# Patient Record
Sex: FEMALE | Race: WHITE | Employment: STUDENT | URBAN - METROPOLITAN AREA
[De-identification: names, ages, dates, MRNs, and addresses within clinical notes are randomized per-mention and may not be internally consistent; named-entity substitution may affect disease eponyms.]

---
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
## 2018-01-31 ENCOUNTER — TELEPHONE (OUTPATIENT)
Dept: PEDIATRICS CLINIC | Age: 18
End: 2018-01-31

## 2018-01-31 NOTE — TELEPHONE ENCOUNTER
Mother states that pt called her from school([employer]) and is c/o of her right side of her chest hurting. No SOB no indigestion feelings, no arm tingling noted. Mother did confirm pt has no cold symptoms that she is aware of but stated that pt just recently had a hard breakup with her boyfriend. Mother feels it may be anxiety related. Advised pt to come home and relax, quiet room slow breathing and nothing too stimulating. Scheduled pt appt this afternoon with TP. Advised mother to come in at 4pm and [facility]. Mother confirmed.

## 2018-02-08 NOTE — TELEPHONE ENCOUNTER
Called to talk to patient and reach mother (number in chart is momDionne' number). Confirmed name,  and discussed how patient is doing. Mom notes she took patient to the ER on 18 with chest pain and all tests were neg. Feels issues were anxiety and stress driven after a break-up with her fiance. Mom states patient is doing better but still hurting. She is back in college but not turning some of her assignments in. Mom hopes she will get better with time and appreciates phone call. Mom did give patient's cell number ([phone]) and stated she would be fine with a phone call. Will follow up today with patient.

## 2018-02-19 NOTE — TELEPHONE ENCOUNTER
Patient stopped by last week to drop off medical release of information. Patient had appointment with GYN for continual unilateral galactorrhea. Spoke to patient and she is doing better. Medical release form completed and sent to Dr. Aneesh De La Garza office.
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
## 2021-09-14 NOTE — PROGRESS NOTES
HISTORY OF PRESENT ILLNESS  Siri Meza is a 21 y.o. female. HPI  Sore Throat  Patient complains of sore throat. Associated symptoms include sinus and nasal congestion, sore throat, right ear pain, fevers up to 100.9 degrees, productive cough, green nasal discharge and pain while swallowing. Left ear started to feel funny today. Onset of symptoms was several days ago, gradually worsening since that time. She is drinking plenty of fluids. . She has not had recent close exposure to someone with proven streptococcal pharyngitis. Was engaged this past July! Review of Systems   Constitutional: Positive for chills (4 days ago) and fever. HENT: Positive for congestion, ear pain, sore throat and tinnitus. Eyes: Negative for discharge. Respiratory: Positive for cough and sputum production. Gastrointestinal: Negative. Skin: Negative. Physical Exam   Constitutional: She appears well-developed and well-nourished. No distress. HENT:   Right Ear: External ear and ear canal normal. Tympanic membrane is erythematous and bulging. Tympanic membrane mobility is abnormal.   Left Ear: Tympanic membrane, external ear and ear canal normal.   Nose: Mucosal edema (w thick secretions) present. Mouth/Throat: Uvula is midline. No oropharyngeal exudate. Cardiovascular: Normal rate and regular rhythm. Murmur heard. Systolic murmur is present with a grade of 3/6   Harsh systolic murmur   Lymphadenopathy:     She has cervical adenopathy. Nursing note and vitals reviewed. ASSESSMENT and PLAN  Diagnoses and all orders for this visit:    1. Acute otitis media in pediatric patient, right  -     amoxicillin (AMOXIL) 875 mg tablet; Take 1 Tab by mouth two (2) times a day for 10 days. 2. Fever, unspecified fever cause  -     AMB POC RAPID STREP A  -     CULTURE, STREP THROAT  -     [place] HANDLG&/OR CONVEY OF SPEC FOR TR OFFICE TO LAB    3.  Cough  -     AMB POC RAPID STREP A         Results for orders placed or performed in visit on 10/10/17   AMB POC RAPID STREP A   Result Value Ref Range    VALID INTERNAL CONTROL POC Yes     Group A Strep Ag Negative Negative     I have discussed the diagnosis with the patient and  mother and the intended plan as seen in the above orders. The patient has received an after-visit summary and questions were answered concerning future plans. I have discussed medication side effects and warnings with the patient and  mother as well. Follow-up Disposition:  Return if symptoms worsen or fail to improve. Simponi Counseling:  I discussed with the patient the risks of golimumab including but not limited to myelosuppression, immunosuppression, autoimmune hepatitis, demyelinating diseases, lymphoma, and serious infections.  The patient understands that monitoring is required including a PPD at baseline and must alert us or the primary physician if symptoms of infection or other concerning signs are noted.
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
## 2022-11-30 ENCOUNTER — OFFICE VISIT (OUTPATIENT)
Dept: SLEEP MEDICINE | Age: 22
End: 2022-11-30
Payer: COMMERCIAL

## 2022-11-30 VITALS
SYSTOLIC BLOOD PRESSURE: 117 MMHG | TEMPERATURE: 99.1 F | BODY MASS INDEX: 21.63 KG/M2 | HEIGHT: 67 IN | OXYGEN SATURATION: 99 % | WEIGHT: 137.8 LBS | RESPIRATION RATE: 16 BRPM | DIASTOLIC BLOOD PRESSURE: 78 MMHG | HEART RATE: 65 BPM

## 2022-11-30 DIAGNOSIS — G47.419 NARCOLEPSY WITHOUT CATAPLEXY: Primary | ICD-10-CM

## 2022-11-30 DIAGNOSIS — Z72.821 INADEQUATE SLEEP HYGIENE: ICD-10-CM

## 2022-11-30 PROCEDURE — 99204 OFFICE O/P NEW MOD 45 MIN: CPT | Performed by: INTERNAL MEDICINE

## 2022-11-30 NOTE — PATIENT INSTRUCTIONS
[address] [facility] [place]. [place], [address]  Tel.  [phone]  Fax. [address] [facility] [address]  [place], [address]  Tel.  [phone]  Fax. [phone] [address]  Tel.  [phone]  Fax. [phone]       Narcolepsy: After Your Visit  Your Care Instructions  Everybody gets a little sleepy once in a while, during a long car ride or other times when you want to be alert. But some people cannot control their sleepiness. It is no fun to be in the middle of your workday or driving your car down the street and have an overwhelming desire to sleep. This condition is called narcolepsy. Doctors do not know what causes narcolepsy. Your doctor may ask you to keep a sleep diary for a couple of weeks. It will help you and your doctor decide on treatment. It often helps to take limited naps during the day. It also helps to create a good mood and place for nighttime sleep. Your doctor may recommend medicine to help you stay awake during the day or sleep at night. Follow-up care is a key part of your treatment and safety. Be sure to make and go to all appointments, and call your doctor if you are having problems. It's also a good idea to know your test results and keep a list of the medicines you take. How can you care for yourself at home? Try to take 2 or 3 short naps at regular times during the day. After a nap, always give yourself time to become alert before you drive a car or do anything that might cause an accident. Take your medicines exactly as prescribed. Call your doctor if you think you are having a problem with your medicine. You may need to try several medicines before you find the one that works best for you. Try to improve your nighttime sleep habits. Here are a few of the things you could do:  Go to bed only when you are sleepy, and get up at the same time every day, even if you do not feel rested.  This might help you sleep well the next night and the night after that. If you lie awake for longer than 15 minutes, get up, leave the bedroom, and do something quiet, such as read, until you feel sleepy again. Avoid drinking or eating anything with caffeine after 3 p.m. This includes coffee, tea, cola drinks, and chocolate. Make sure your bedroom is not too hot or too cold, and keep it quiet and dark. Make sure your mattress provides good support. Be kind to your body:  Relieve tension with exercise or a massage. Learn and do relaxation techniques. Avoid alcohol, caffeine, nicotine, and illegal drugs. They can increase your anxiety level and cause sleep problems. Get light exercise daily. Gentle stretching, light aerobics, swimming, walking, and riding a bicycle can help to keep you going during the day and to sleep well at night. Eat a healthy diet. You may feel better if you avoid heavy meals and eat more fruits and vegetables. Do not use over-the-counter sleeping pills. They can make your sleep restless. Ask your doctor if any medicines you take could cause sleepiness. For example, cold and allergy medicines can make you drowsy. Consider joining a support group with people who have narcolepsy or other sleep problems. These groups can be a good source of tips for what to do. Also, it can be comforting to talk to people who face similar challenges. Your doctor can tell you how to contact a support group. When should you call for help? Call your doctor now or seek immediate medical care if:  You passed out (lost consciousness). You cannot use your muscles. This may happen very briefly, sometimes after you laugh or are angry, and may only affect part of your body. Watch closely for changes in your health, and be sure to contact your doctor if:  Your sleepiness continues to get worse. Where can you learn more?    Go to Element[employer]  Enter V069 in the search box to learn more about \"Narcolepsy: After Your Visit. \"   © 8173-4613 Healthwise, Incorporated. Care instructions adapted under license by [employer] (which disclaims liability or warranty for this information). This care instruction is for use with your licensed healthcare professional. If you have questions about a medical condition or this instruction, always ask your healthcare professional. [address] any warranty or liability for your use of this information. Content Version: 9.0.40454; Last Revised: September 15, 2009  PROPER SLEEP HYGIENE    What to avoid  Do not have drinks with caffeine, such as coffee or black tea, for 8 hours before bed. Do not smoke or use other types of tobacco near bedtime. Nicotine is a stimulant and can keep you awake. Avoid drinking alcohol late in the evening, because it can cause you to wake in the middle of the night. Do not eat a big meal close to bedtime. If you are hungry, eat a light snack. Do not drink a lot of water close to bedtime, because the need to urinate may wake you up during the night. Do not read or watch TV in bed. Use the bed only for sleeping and sexual activity. What to try  Go to bed at the same time every night, and wake up at the same time every morning. Do not take naps during the day. Keep your bedroom quiet, dark, and cool. Get regular exercise, but not within 3 to 4 hours of your bedtime. .  Sleep on a comfortable pillow and mattress. If watching the clock makes you anxious, turn it facing away from you so you cannot see the time. If you worry when you lie down, start a worry book. Well before bedtime, write down your worries, and then set the book and your concerns aside. Try meditation or other relaxation techniques before you go to bed. If you cannot fall asleep, get up and go to another room until you feel sleepy. Do something relaxing. Repeat your bedtime routine before you go to bed again. Make your house quiet and calm about an hour before bedtime. Turn down the lights, turn off the TV, log off the computer, and turn down the volume on music. This can help you relax after a busy day. Drowsy Driving: The [employer] cites drowsiness as a causing factor in more than 102,986 police reported crashes annually, resulting in 76,000 injuries and 1,500 deaths. Other surveys suggest 55% of people polled have driven while drowsy in the past year, 23% had fallen asleep but not crashed, 3% crashed, and 2% had and accident due to drowsy driving. Who is at risk? Young Drivers: One study of drowsy driving accidents states that 55% of the drivers were under 25 years. Of those, 75% were male. Shift Workers and Travelers: People who work overnight or travel across time zones frequently are at higher risk of experiencing Circadian Rhythm Disorders. They are trying to work and function when their body is programed to sleep. Sleep Deprived: Lack of sleep has a serious impact on your ability to pay attention or focus on a task. Consistently getting less than the average of 8 hours your body needs creates partial or cumulative sleep deprivation. Untreated Sleep Disorders: Sleep Apnea, Narcolepsy, R.L.S., and other sleep disorders (untreated) prevent a person from getting enough restful sleep. This leads to excessive daytime sleepiness and increases the risk for drowsy driving accidents by up to 7 times. Medications / Alcohol: Even over the counter medications can cause drowsiness. Medications that impair a drivers attention should have a warning label. Alcohol naturally makes you sleepy and on its own can cause accidents. Combined with excessive drowsiness its effects are amplified.    Signs of Drowsy Driving:   * You don't remember driving the last few miles   * You may drift out of your rosalia   * You are unable to focus and your thoughts wander   * You may yawn more often than normal   * You have difficulty keeping your eyes open / nodding off   * Missing traffic signs, speeding, or tailgating  Prevention-   Good sleep hygiene, lifestyle and behavioral choices have the most impact on drowsy driving. There is no substitute for sleep and the average person requires 8 hours nightly. If you find yourself driving drowsy, stop and sleep. Consider the sleep hygiene tips provided during your visit as well. Medication Refill Policy: Refills for all medications require 1 week advance notice. Please have your pharmacy fax a refill request. We are unable to fax, or call in \"controled substance\" medications and you will need to pick these prescriptions up from our office. [employer] Activation    Thank you for requesting access to [employer]. Please follow the instructions below to securely access and download your online medical record. [employer] allows you to send messages to your doctor, view your test results, renew your prescriptions, schedule appointments, and more. How Do I Sign Up? In your internet browser, go to [email or web address] [email or web address]. Click on the First Time User? Click Here link in the Sign In box. You will see the New Member Sign Up page. Enter your [employer] Access Code exactly as it appears below. You will not need to use this code after youve completed the sign-up process. If you do not sign up before the expiration date, you must request a new code. [employer] Access Code: Activation code not generated  Current [employer] Status: Active (This is the date your Drivet access code will )    Enter the last four digits of your Social Security Number (xxxx) and Date of Birth (mm/dd/yyyy) as indicated and click Submit. You will be taken to the next sign-up page. Create a Drivet ID. This will be your [employer] login ID and cannot be changed, so think of one that is secure and easy to remember. Create a [employer] password. You can change your password at any time. Enter your Password Reset Question and Answer.  This can be used at a later time if you forget your password. Enter your e-mail address. You will receive e-mail notification when new information is available in [address] Click Sign Up. You can now view and download portions of your medical record. Click the Mailgun link to download a portable copy of your medical information. Additional Information    If you have questions, please call [phone]. Remember, [employer] is NOT to be used for urgent needs. For medical emergencies, dial 911.

## 2022-11-30 NOTE — PROGRESS NOTES
[address] [facility] [address] [place]. [place], [address]  Tel.  [phone]  Fax. [address] [facility]  [place], [address]  Tel.  [phone]  Fax. [phone] [address]  Tel.  [phone]  Fax. [phone]       Tam Molina is a 23y.o. year old female referred by PCP  for evaluation of a sleep disorder. ASSESSMENT/PLAN:      ICD-10-CM ICD-9-CM    1. Narcolepsy without cataplexy  G47.419 347.00 POLYSOMNOGRAPHY 1 NIGHT      DRUG ABUSE PROF, URINE (SEVEN DRUGS), MS COFIRM      MULTIPLE SLEEP LATENCY TEST      DRUG ABUSE PROF, URINE (SEVEN DRUGS), MS COFIRM      2. Inadequate sleep hygiene  Z72.821 307.49       3. BMI 21.0-21.9, adult  Z68.21 V85.1           Patient has a history and examination consistent with the diagnosis of narcolepsy. Follow-up and Dispositions    Return for telephone follow-up after testing is completed. * Sleep testing was ordered for initial evaluation. Orders Placed This Encounter    MULTIPLE SLEEP LATENCY TEST     Standing Status:   Future     Standing Expiration Date:   11/30/2023    DRUG ABUSE PROF, URINE (SEVEN DRUGS), MS COFIRM     Standing Status:   Future     Number of Occurrences:   1     Standing Expiration Date:   5/30/2023    POLYSOMNOGRAPHY 1 NIGHT     Order Specific Question:   Reason for Exam     Answer:   Narcolepsy         * The patient currently has a Minimal risk for having sleep apnea. STOP-BANG score 1.  * Since narcolepsy may present in this manner testing is advised. * PSG / MSLT was ordered for initial evaluation of narcolepsy. Testing protocol including need for urine drug screen reviewed. * The need for cancelling the daytime testing in the event of certain night time findings was discussed. * Effect of medications on testing was discussed. * Patient is aware of the need to not start any new medications until testing is completed.     2. Inadequate sleep hygiene - Counseling was provided regarding proper sleep hygiene to include but not limited to effect of multi-media interaction in sleep environment and of the need to use the bed only for sleeping. * Counseling was also provided regarding age appropriate sleep needs and sleep environment safety. Components of CBT-I,  namely paradoxical intention and stimulus control therapy were reviewed. * Patient is aware of the need to avoid driving or performing tasks requiring a high degree of vigilance in the presence of unintentional sleep attacks. Thank you for allowing us to participate in your patient's medical care. We'll keep you updated on these investigations. SUBJECTIVE/OBJECTIVE:    Ross Spivey is an 23 y.o. female referred for evaluation for a sleep disorder. She complains of excessive daytime tiredness / sleepiness associated with feeling sleepy while at work, driving, reading and viewing television and during meetings. Symptoms began 1 year ago, unchanged since that time. She usually gets in bed by 9:00 pm and can fall asleep, sleeps all night but wakes up feeling tired and fatigued. Or she is unable to fall asleep until a variable hour, lays awake in bed sometimes on her phone. Family or house members do not note snoring. She denies of symptoms indicative of cataplexy or sleep related hallucinations. She does report of Sleep Paralysis. She denies of a history of unusual movements occurring during sleep. Ross Spivey does wake up frequently at night. She is bothered by waking up too early and left unable to get back to sleep. She actually sleeps about 9 hours at night and wakes up about 3 times during the night. She does not work shifts:  .   Mich Chacon indicates she does get too little sleep at night. Her bedtime is 2000. She awakens at 56. She does not take naps. She has the following observed behaviors: Sleep talking, Grinding teeth; Nightmares.   Other remarks: vivid dreams    The patient has not undergone diagnostic testing for the current problems. Review of Systems:  Constitutional:  No significant weight loss or weight gain  Eyes:  No blurred vision  CVS:  No significant chest pain  Pulm:  No significant shortness of breath  GI:  No significant nausea or vomiting  :  No significant nocturia  Musculoskeletal:  No significant joint pain at night  Skin:  No significant rashes  Neuro:  No significant dizziness   Psych:  No active mood issues    Sleep Review of Systems: notable for Positive difficulty falling asleep; Positive awakenings at night; Positive perceived regular dreaming; Positive nightmares; Negative  early morning headaches; Positive  memory problems; Positive  concentration issues; Negative caffeine;  Negative alcohol;   Negative history of any automobile or occupational accidents due to daytime drowsiness. [place] Sleepiness Score: 15   and Modified F.O.S.Q. Score Total / 2: 12    Visit Vitals  /78 (BP 1 Location: Right upper arm, BP Patient Position: Sitting, BP Cuff Size: Adult)   Pulse 65   Temp 99.1 °F (37.3 °C) (Temporal)   Resp 16   Ht 5' 7\" (1.702 m)   Wt 137 lb 12.8 oz (62.5 kg)   LMP 10/22/2022   SpO2 99%   BMI 21.58 kg/m²           General:   Alert, oriented, not in acute distress   Eyes:  Anicteric Sclerae; intact EOM's   Nose:  No obvious nasal septum deviation    Oropharynx:   Mallampati score 3, thick tongue base, uvula seen partially, low-lying soft palate, narrow tonsilo-pharyngeal pilars   Neck:   midline trachea,  no JVD   Chest/Lungs:  symmetrical lung expansion ,clear lung fields on auscultation    CVS:  Normal rate, regular rhythm    Extremities:  No obvious rashes, absent edema    Neuro:  No focal deficits; No obvious tremor    Psych:  Normal eye contact; normal  affect, normal countenance          Chris Valentin MD, FAASM  Diplomate American Board of Sleep Medicine  Diplomate in Sleep Medicine - ABP    Electronically signed. 11/30/22

## 2022-11-30 NOTE — PROGRESS NOTES
Rm    Chief Complaint   Patient presents with    New Patient        Visit Vitals  /78 (BP 1 Location: Right upper arm, BP Patient Position: Sitting, BP Cuff Size: Adult)   Pulse 65   Temp 99.1 °F (37.3 °C) (Temporal)   Resp 16   Ht 5' 7\" (1.702 m)   Wt 137 lb 12.8 oz (62.5 kg)   LMP 10/22/2022   SpO2 99%   BMI 21.58 kg/m²        1. Have you been to the ER, urgent care clinic since your last visit? Hospitalized since your last visit? No    2. Have you seen or consulted any other health care providers outside of the [address] [place] [address] since your last visit? Include any pap smears or colon screening. No     Health Maintenance Due   Topic Date Due    Hepatitis C Screening  Never done    Depression Screen  Never done    COVID-19 Vaccine (1) Never done    HPV Age 9Y-34Y (3 - 3-dose series) 04/26/2018    Pap Smear  Never done    DTaP/Tdap/Td series (7 - Td or Tdap) 01/13/2021    Flu Vaccine (1) 08/01/2022        No flowsheet data found. No flowsheet data found.     Learning Assessment 9/29/2014   PRIMARY LEARNER Patient   BARRIERS PRIMARY LEARNER NONE   CO-LEARNER CAREGIVER Yes   CO-LEARNER NAME WING   CO-LEARNER HIGHEST LEVEL OF EDUCATION SOME COLLEGE   BARRIERS CO-LEARNER NONE   PRIMARY LANGUAGE ENGLISH   PRIMARY LANGUAGE CO-LEARNER ENGLISH    NEED No   LEARNER PREFERENCE CO-LEARNER LISTENING   LEARNING SPECIAL TOPICS no   ANSWERED BY WING   RELATIONSHIP LEGAL GUARDIAN   ASSESSMENT COMMENT no

## 2023-01-24 ENCOUNTER — HOSPITAL ENCOUNTER (OUTPATIENT)
Dept: SLEEP MEDICINE | Age: 23
Discharge: HOME | End: 2023-01-24
Payer: COMMERCIAL

## 2023-01-24 PROCEDURE — 95810 POLYSOM 6/> YRS 4/> PARAM: CPT | Performed by: INTERNAL MEDICINE

## 2023-01-25 ENCOUNTER — TELEPHONE (OUTPATIENT)
Dept: SLEEP MEDICINE | Age: 23
End: 2023-01-25

## 2023-01-25 ENCOUNTER — HOSPITAL ENCOUNTER (OUTPATIENT)
Dept: SLEEP MEDICINE | Age: 23
Discharge: HOME | End: 2023-01-25
Payer: COMMERCIAL

## 2023-01-25 VITALS
TEMPERATURE: 98.4 F | OXYGEN SATURATION: 99 % | HEART RATE: 68 BPM | DIASTOLIC BLOOD PRESSURE: 76 MMHG | HEIGHT: 67 IN | WEIGHT: 137 LBS | SYSTOLIC BLOOD PRESSURE: 117 MMHG | BODY MASS INDEX: 21.5 KG/M2

## 2023-01-25 DIAGNOSIS — G47.419 NARCOLEPSY WITHOUT CATAPLEXY: ICD-10-CM

## 2023-01-25 PROCEDURE — 95805 MULTIPLE SLEEP LATENCY TEST: CPT | Performed by: INTERNAL MEDICINE

## 2023-01-25 NOTE — PROGRESS NOTES
[address] [facility]. [place], [address]  Tel.  [phone]  Fax. [address] [facility] [address]  [place], [address] [facility]  Tel.  [phone]  Fax. [phone] [address]   Tel.  [phone]  Fax. [phone]     Sleep Study Technical Notes        PRE-Test:  Rozina Lane (: 2000) arrived on time Vitals taken: temperature (98.4) BP (117/76) SpO2 (99%) HR (68). She was shown directly to the room. Paperwork completed. She was left to prepare for bed. She is here for a PSG with a MSLT to follow. Procedure explained, questions answered. The patient understands that she need to meet the requirement to proceed with the MSLT. Electrodes were applied without incident. Once settled in bed, the study was started. Acquisition Notes:  Lights off: 10:22pm  Sleep onset: 11:04pm  Respiratory events: None  ECG:  NSR  Initial REM onset: 2:28am  Other comments: The mattress of the Sleep Number bed was softened for comfort at her request.  No respiratory events were observed. AHI=0.1  TST=6.9 hrs  REM Yflykdf=195 mins  REM Percentage=25.1%  The patient met the criteria to proceed with the MSLT. POST Test:  Patient awakened. Electrodes not needed for the MSLT were removed. Post Sleep Questionnaire completed. Patient was turned over to the daytime technologist. Equipment removed. cleaned per infection control policy.

## 2023-01-30 ENCOUNTER — TELEPHONE (OUTPATIENT)
Dept: SLEEP MEDICINE | Age: 23
End: 2023-01-30

## 2023-01-30 DIAGNOSIS — G47.11 IDIOPATHIC HYPERSOMNIA: Primary | ICD-10-CM

## 2023-01-30 RX ORDER — ARMODAFINIL 150 MG/1
1 TABLET ORAL DAILY
Qty: 30 TABLET | Refills: 2 | Status: SHIPPED | OUTPATIENT
Start: 2023-01-30

## 2023-01-30 NOTE — TELEPHONE ENCOUNTER
P2P review done denial up held due to lack of symptomatology suggesting presence of ONEAL. Left message for patient to call back to discuss her sleep symptoms.

## 2023-01-30 NOTE — TELEPHONE ENCOUNTER
Results of testing discussed with patient, use of medications and potential side effects discussed. Patient stated that she is currently not taking birth control pills. Schedule return visit in 3 months     Orders Placed This Encounter    Armodafinil 150 mg tab     Sig: Take 1 Tablet by mouth daily. Max Daily Amount: 1 Tablet.      Dispense:  30 Tablet     Refill:  2

## 2023-01-31 ENCOUNTER — DOCUMENTATION ONLY (OUTPATIENT)
Dept: SLEEP MEDICINE | Age: 23
End: 2023-01-31

## 2023-02-03 ENCOUNTER — TELEPHONE (OUTPATIENT)
Dept: SLEEP MEDICINE | Age: 23
End: 2023-02-03

## 2023-02-06 ENCOUNTER — DOCUMENTATION ONLY (OUTPATIENT)
Dept: SLEEP MEDICINE | Age: 23
End: 2023-02-06

## 2023-02-06 NOTE — PROGRESS NOTES
Prior authorization for Armodafinil 150mg was approved on Cover My Meds on 02/06/2023. [id number] [phone].

## 2025-02-05 ENCOUNTER — TRANSCRIBE ORDERS (OUTPATIENT)
Facility: HOSPITAL | Age: 25
End: 2025-02-05

## 2025-02-05 DIAGNOSIS — S69.81XA INJURY OF TRIANGULAR FIBROCARTILAGE COMPLEX (TFCC) OF RIGHT WRIST, INITIAL ENCOUNTER: Primary | ICD-10-CM

## 2025-02-10 ENCOUNTER — TRANSCRIBE ORDERS (OUTPATIENT)
Facility: HOSPITAL | Age: 25
End: 2025-02-10

## 2025-02-10 DIAGNOSIS — S69.81XA INJURY OF TRIANGULAR FIBROCARTILAGE COMPLEX (TFCC) OF RIGHT WRIST, INITIAL ENCOUNTER: Primary | ICD-10-CM

## 2025-04-22 ENCOUNTER — HOSPITAL ENCOUNTER (OUTPATIENT)
Facility: HOSPITAL | Age: 25
Discharge: HOME | End: 2025-04-25
Attending: ORTHOPAEDIC SURGERY
Payer: COMMERCIAL

## 2025-04-22 DIAGNOSIS — S69.81XA INJURY OF TRIANGULAR FIBROCARTILAGE COMPLEX (TFCC) OF RIGHT WRIST, INITIAL ENCOUNTER: ICD-10-CM

## 2025-04-22 PROCEDURE — A9575 INJ GADOTERATE MEGLUMI 0.1ML: HCPCS | Performed by: STUDENT IN AN ORGANIZED HEALTH CARE EDUCATION/TRAINING PROGRAM

## 2025-04-22 PROCEDURE — 73115 CONTRAST X-RAY OF WRIST: CPT

## 2025-04-22 PROCEDURE — 73222 MRI JOINT UPR EXTREM W/DYE: CPT

## 2025-04-22 PROCEDURE — 6360000004 HC RX CONTRAST MEDICATION: Performed by: STUDENT IN AN ORGANIZED HEALTH CARE EDUCATION/TRAINING PROGRAM

## 2025-04-22 PROCEDURE — 6360000002 HC RX W HCPCS: Performed by: STUDENT IN AN ORGANIZED HEALTH CARE EDUCATION/TRAINING PROGRAM

## 2025-04-22 RX ORDER — GADOTERATE MEGLUMINE 376.9 MG/ML
0.1 INJECTION INTRAVENOUS ONCE
Status: COMPLETED | OUTPATIENT
Start: 2025-04-22 | End: 2025-04-22

## 2025-04-22 RX ORDER — LIDOCAINE HYDROCHLORIDE 10 MG/ML
5 INJECTION, SOLUTION EPIDURAL; INFILTRATION; INTRACAUDAL; PERINEURAL ONCE
Status: COMPLETED | OUTPATIENT
Start: 2025-04-22 | End: 2025-04-22

## 2025-04-22 RX ADMIN — LIDOCAINE HYDROCHLORIDE 5 ML: 10 INJECTION, SOLUTION EPIDURAL; INFILTRATION; INTRACAUDAL; PERINEURAL at 13:24

## 2025-04-22 RX ADMIN — GADOTERATE MEGLUMINE 0.1 ML: 376.9 INJECTION, SOLUTION INTRAVENOUS at 13:51

## 2025-04-22 RX ADMIN — IOHEXOL 10 ML: 180 INJECTION INTRAVENOUS at 13:51
